# Patient Record
Sex: MALE | Race: WHITE | NOT HISPANIC OR LATINO | Employment: OTHER | URBAN - METROPOLITAN AREA
[De-identification: names, ages, dates, MRNs, and addresses within clinical notes are randomized per-mention and may not be internally consistent; named-entity substitution may affect disease eponyms.]

---

## 2021-04-07 ENCOUNTER — OFFICE VISIT (OUTPATIENT)
Dept: DERMATOLOGY | Facility: CLINIC | Age: 81
End: 2021-04-07
Payer: MEDICARE

## 2021-04-07 VITALS — TEMPERATURE: 97.9 F

## 2021-04-07 DIAGNOSIS — Z85.828 HISTORY OF SKIN CANCER: ICD-10-CM

## 2021-04-07 DIAGNOSIS — L82.1 SEBORRHEIC KERATOSIS: Primary | ICD-10-CM

## 2021-04-07 DIAGNOSIS — Z13.89 SCREENING FOR SKIN CONDITION: ICD-10-CM

## 2021-04-07 PROCEDURE — 99203 OFFICE O/P NEW LOW 30 MIN: CPT | Performed by: DERMATOLOGY

## 2021-04-07 RX ORDER — LIDOCAINE 4 G/G
1 PATCH TOPICAL DAILY
COMMUNITY

## 2021-04-07 RX ORDER — METOPROLOL TARTRATE 50 MG/1
TABLET, FILM COATED ORAL
COMMUNITY
Start: 2021-03-04

## 2021-04-07 RX ORDER — ENALAPRIL MALEATE 2.5 MG/1
TABLET ORAL
COMMUNITY
Start: 2021-01-14

## 2021-04-07 RX ORDER — DIGOXIN 125 MCG
125 TABLET ORAL DAILY
COMMUNITY
Start: 2021-02-27

## 2021-04-07 RX ORDER — FUROSEMIDE 40 MG/1
40 TABLET ORAL DAILY
COMMUNITY
Start: 2021-02-08

## 2021-04-07 RX ORDER — WARFARIN SODIUM 1 MG/1
TABLET ORAL
COMMUNITY

## 2021-04-07 RX ORDER — POTASSIUM CHLORIDE 750 MG/1
10 TABLET, FILM COATED, EXTENDED RELEASE ORAL DAILY
COMMUNITY
Start: 2021-01-14

## 2021-04-07 NOTE — PROGRESS NOTES
[address]  [address] [facility] [address]  [phone]  [phone]     MRN: [id number] : 1940  Encounter: [id number]  Patient Information: Augie Maynard  Chief complaint: skin cancer check up    History of present illness:  79-year-old male presents for overall skin check patient with recent basal cell carcinoma excised from his left naris by Dr Alton Smith here for overall checkup  Past Medical History:   Diagnosis Date    A-fib [facility])     Hypertension     Skin cancer      Past Surgical History:   Procedure Laterality Date    SKIN CANCER EXCISION       Social History   Social History     Substance and Sexual Activity   Alcohol Use Yes     Social History     Substance and Sexual Activity   Drug Use Never     Social History     Tobacco Use   Smoking Status Former Smoker    Types: Cigarettes    Quit date: 1971    Years since quittin 0   Smokeless Tobacco Never Used     Family History   Problem Relation Age of Onset    Depression Mother      Meds/Allergies   No Known Allergies    Meds:  Prior to Admission medications    Medication Sig Start Date End Date Taking? Authorizing Provider   BIOFLAVONOIDS PO Take 1 tablet by mouth 2 (two) times a day   Yes Historical Provider, MD   digoxin (LANOXIN) 0 125 mg tablet Take 125 mcg by mouth daily 21  Yes Historical Provider, MD   enalapril (VASOTEC) 2 5 mg tablet TAKE 1 TABLET BY MOUTH EVERY DAY 21  Yes Historical Provider, MD   furosemide (LASIX) 40 mg tablet Take 40 mg by mouth daily 21  Yes Historical Provider, MD   Lidocaine 4 % PTCH Place 1 patch on the skin daily   Yes Historical Provider, MD   metoprolol tartrate (LOPRESSOR) 50 mg tablet TAKE 1 TABLET (50 MG TOTAL) BY MOUTH 2 (TWO) TIMES A DAY   TAKE ONE AND ONE HALF TABLETS TWICE A DAY 3/4/21  Yes Historical Provider, MD   potassium chloride (Klor-Con) 10 mEq tablet Take 10 mEq by mouth daily 21  Yes Historical Provider, MD   warfarin (COUMADIN) 1 mg tablet Take by mouth   Yes Historical Provider, MD       Subjective:     Review of Systems:    General: negative for - chills, fatigue, fever,  weight gain or weight loss  Psychological: negative for - anxiety, behavioral disorder, concentration difficulties, decreased libido, depression, irritability, memory difficulties, mood swings, sleep disturbances or suicidal ideation  ENT: negative for - hearing difficulties , nasal congestion, nasal discharge, oral lesions, sinus pain, sneezing, sore throat  Allergy and Immunology: negative for - hives, insect bite sensitivity,  Hematological and Lymphatic: negative for - bleeding problems, blood clots,bruising, swollen lymph nodes  Endocrine: negative for - hair pattern changes, hot flashes, malaise/lethargy, mood swings, palpitations, polydipsia/polyuria, skin changes, temperature intolerance or unexpected weight change  Respiratory: negative for - cough, hemoptysis, orthopnea, shortness of breath, or wheezing  Cardiovascular: negative for - chest pain, dyspnea on exertion, edema,  Gastrointestinal: negative for - abdominal pain, nausea/vomiting  Genito-Urinary: negative for - dysuria, incontinence, irregular/heavy menses or urinary frequency/urgency  Musculoskeletal: negative for - gait disturbance, joint pain, joint stiffness, joint swelling, muscle pain, muscular weakness  Dermatological:  As in HPI  Neurological: negative for confusion, dizziness, headaches, impaired coordination/balance, memory loss, numbness/tingling, seizures, speech problems, tremors or weakness       Objective:   Temp 97 9 °F (36 6 °C) (Temporal)     Physical Exam:    General Appearance:    Alert, cooperative, no distress   Head:    Normocephalic, without obvious abnormality, atraumatic           Skin:   A full skin exam was performed including scalp, head scalp, eyes, ears, nose, lips, neck, chest, axilla, abdomen, back, buttocks, bilateral upper extremities, bilateral lower extremities, hands, feet, fingers, toes, fingernails, and toenails previous sites skin cancer well healed without recurrence normal keratotic papules greasy stuck on appearance nothing else remarkable noted on complete exam     Assessment:     1  Seborrheic keratosis     2  Screening for skin condition     3  History of skin cancer           Plan:   Seborrheic keratosis patient reassured these are normal growths we acquire with age no treatment needed  History of skin cancer in no recurrence nothing else atypical sunblock recommended follow-up in 1 year  Screening for dermatologic disorders nothing else of concern noted on complete exam follow-up in 1 year      Dixon Jerez MD  4/7/2021,2:43 PM    Portions of the record may have been created with voice recognition software   Occasional wrong word or "sound a like" substitutions may have occurred due to the inherent limitations of voice recognition software   Read the chart carefully and recognize, using context, where substitutions have occurred

## 2022-04-20 ENCOUNTER — OFFICE VISIT (OUTPATIENT)
Dept: DERMATOLOGY | Facility: CLINIC | Age: 82
End: 2022-04-20
Payer: MEDICARE

## 2022-04-20 VITALS — HEIGHT: 66 IN | WEIGHT: 174 LBS | BODY MASS INDEX: 27.97 KG/M2 | TEMPERATURE: 98 F

## 2022-04-20 DIAGNOSIS — Z13.89 SCREENING FOR SKIN CONDITION: ICD-10-CM

## 2022-04-20 DIAGNOSIS — L82.1 SEBORRHEIC KERATOSIS: Primary | ICD-10-CM

## 2022-04-20 DIAGNOSIS — Z85.828 HISTORY OF SKIN CANCER: ICD-10-CM

## 2022-04-20 PROCEDURE — 99213 OFFICE O/P EST LOW 20 MIN: CPT | Performed by: DERMATOLOGY

## 2022-04-20 RX ORDER — NEOMYCIN SULFATE, POLYMYXIN B SULFATE AND DEXAMETHASONE 3.5; 10000; 1 MG/ML; [USP'U]/ML; MG/ML
SUSPENSION/ DROPS OPHTHALMIC
COMMUNITY
Start: 2022-03-25

## 2022-04-20 RX ORDER — VERAPAMIL HYDROCHLORIDE 80 MG/1
TABLET ORAL
COMMUNITY

## 2022-04-20 NOTE — PROGRESS NOTES
[address]  [facility] [address]  [phone]  [phone]     MRN: [id number] : 1940  Encounter: [id number]  Patient Information: Marlee Simmons  Chief complaint: yearly check up    History of present illness: 79Year old male presents for overall skin check previous history of basal cell carcinoma excised by Dr David gandhi over a yearago no other concerns noted  Past Medical History:   Diagnosis Date    [facility] ([facility] [address] )     Hypertension     Skin cancer      Past Surgical History:   Procedure Laterality Date    SKIN CANCER EXCISION       Social History   Social History     Substance and Sexual Activity   Alcohol Use Yes     Social History     Substance and Sexual Activity   Drug Use Never     Social History     Tobacco Use   Smoking Status Former Smoker    Types: Cigarettes    Quit date: 1971    Years since quittin 0   Smokeless Tobacco Never Used     Family History   Problem Relation Age of Onset    Depression Mother      Meds/Allergies   No Known Allergies    Meds:  Prior to Admission medications    Medication Sig Start Date End Date Taking? Authorizing Provider   Aspirin Buf,CaCarb-MgCarb-MgO, 81 MG TABS once a day   Yes Historical Provider, MD   digoxin (LANOXIN) 0 125 mg tablet Take 125 mcg by mouth daily 21  Yes Historical Provider, MD   enalapril (VASOTEC) 2 5 mg tablet TAKE 1 TABLET BY MOUTH EVERY DAY 21  Yes Historical Provider, MD   furosemide (LASIX) 40 mg tablet Take 40 mg by mouth daily 21  Yes Historical Provider, MD   metoprolol tartrate (LOPRESSOR) 50 mg tablet TAKE 1 TABLET (50 MG TOTAL) BY MOUTH 2 (TWO) TIMES A DAY   TAKE ONE AND ONE HALF TABLETS TWICE A DAY 3/4/21  Yes Historical Provider, MD   neomycin-polymyxin-dexamethasone (MAXITROL) ophthalmic suspension USE 5 DROPS IN BOTH EARS TWICE A DAY AS NEEDED 3/25/22  Yes Historical Provider, MD   potassium chloride (Klor-Con) 10 mEq tablet Take 10 mEq by mouth daily 1/14/21  Yes Historical Provider, MD   verapamil (CALAN) 80 mg tablet Take by mouth   Yes Historical Provider, MD   warfarin (COUMADIN) 1 mg tablet Take by mouth   Yes Historical Provider, MD   BIOFLAVONOIDS PO Take 1 tablet by mouth 2 (two) times a day  Patient not taking: Reported on 4/20/2022     Historical Provider, MD   Lidocaine 4 % PTCH Place 1 patch on the skin daily  Patient not taking: Reported on 4/20/2022     Historical Provider, MD       Subjective:     Review of Systems:    General: negative for - chills, fatigue, fever,  weight gain or weight loss  Psychological: negative for - anxiety, behavioral disorder, concentration difficulties, decreased libido, depression, irritability, memory difficulties, mood swings, sleep disturbances or suicidal ideation  ENT: negative for - hearing difficulties , nasal congestion, nasal discharge, oral lesions, sinus pain, sneezing, sore throat  Allergy and Immunology: negative for - hives, insect bite sensitivity,  Hematological and Lymphatic: negative for - bleeding problems, blood clots,bruising, swollen lymph nodes  Endocrine: negative for - hair pattern changes, hot flashes, malaise/lethargy, mood swings, palpitations, polydipsia/polyuria, skin changes, temperature intolerance or unexpected weight change  Respiratory: negative for - cough, hemoptysis, orthopnea, shortness of breath, or wheezing  Cardiovascular: negative for - chest pain, dyspnea on exertion, edema,  Gastrointestinal: negative for - abdominal pain, nausea/vomiting  Genito-Urinary: negative for - dysuria, incontinence, irregular/heavy menses or urinary frequency/urgency  Musculoskeletal: negative for - gait disturbance, joint pain, joint stiffness, joint swelling, muscle pain, muscular weakness  Dermatological:  As in HPI  Neurological: negative for confusion, dizziness, headaches, impaired coordination/balance, memory loss, numbness/tingling, seizures, speech problems, tremors or weakness       Objective:   Temp 98 °F (36 7 °C) (Temporal)   Ht 5' 5 5" (1 664 m)   Wt 78 9 kg (174 lb)   BMI 28 51 kg/m²     Physical Exam:    General Appearance:    Alert, cooperative, no distress   Head:    Normocephalic, without obvious abnormality, atraumatic           Skin:   A full skin exam was performed including scalp, head scalp, eyes, ears, nose, lips, neck, chest, axilla, abdomen, back, buttocks, bilateral upper extremities, bilateral lower extremities, hands, feet, fingers, toes, fingernails, and toenails previous site of skin cancer well healed without recurrence normal keratotic papules with greasy stuck appearance nothing else atypical noted on complete exam     Assessment:     1  Seborrheic keratosis     2  Screening for skin condition     3  History of skin cancer           Plan:   Seborrheic keratosis patient reassured these are normal growths we acquire with age no treatment needed  History of skin cancer in no recurrence nothing else atypical sunblock recommended follow-up in 1 year  Screening for dermatologic disorders nothing else of concern noted on complete exam follow-up in 1 year      Ebonie Elliott MD  4/20/2022,2:34 PM    Portions of the record may have been created with voice recognition software   Occasional wrong word or "sound a like" substitutions may have occurred due to the inherent limitations of voice recognition software   Read the chart carefully and recognize, using context, where substitutions have occurred

## 2022-10-21 ENCOUNTER — TELEPHONE (OUTPATIENT)
Dept: HEMATOLOGY ONCOLOGY | Facility: CLINIC | Age: 82
End: 2022-10-21

## 2022-10-21 NOTE — TELEPHONE ENCOUNTER
CALL TRANSFER   Reason for patient call? Patients spouse would like to know if Dr Naresh Wolff does wound care  They are previous patients of Dr Naresh Wolff  Patient's primary physician? Dr Naresh Wolff (previous patient)   RN call was transferred to and time it was transferred? Bettye Price @ 1:25PM   Informed patient that the message will be forwarded to the team and someone will get back to them as soon as possible    Did you relay this information to the patient?  Yes

## 2022-10-21 NOTE — TELEPHONE ENCOUNTER
Spoke to patients wife about possibly caring for his open backside wound that reopened  Patient had a wound years ago that dr Jodi Brown took care of at [facility] and patient is wondering if dr Jodi Brown could care for this reopened wound  I advised dr Jodi Brown is not in the office today but I will review when he is back in office  Patients wife stated that even if he wont care for the wound even if dr Jodi Brown could recommend someone  Patient gave permission over the phone to text her if needed  Wound is getting bigger and bigger and patient's wife is concerned  I adivsed I will call her when I know more and I will review when dr Jodi Brown and I are back in office together